# Patient Record
Sex: FEMALE | Race: WHITE | NOT HISPANIC OR LATINO
[De-identification: names, ages, dates, MRNs, and addresses within clinical notes are randomized per-mention and may not be internally consistent; named-entity substitution may affect disease eponyms.]

---

## 2021-01-26 ENCOUNTER — APPOINTMENT (OUTPATIENT)
Dept: PLASTIC SURGERY | Facility: CLINIC | Age: 21
End: 2021-01-26
Payer: COMMERCIAL

## 2021-01-26 VITALS — HEIGHT: 67 IN | WEIGHT: 160 LBS | BODY MASS INDEX: 25.11 KG/M2

## 2021-01-26 DIAGNOSIS — D22.9 MELANOCYTIC NEVI, UNSPECIFIED: ICD-10-CM

## 2021-01-26 PROBLEM — Z00.00 ENCOUNTER FOR PREVENTIVE HEALTH EXAMINATION: Status: ACTIVE | Noted: 2021-01-26

## 2021-01-26 PROCEDURE — 99072 ADDL SUPL MATRL&STAF TM PHE: CPT

## 2021-01-26 PROCEDURE — 99203 OFFICE O/P NEW LOW 30 MIN: CPT

## 2021-01-26 RX ORDER — NORGESTIMATE AND ETHINYL ESTRADIOL 7DAYSX3 LO
0.18/0.215/0.25 KIT ORAL
Refills: 0 | Status: ACTIVE | COMMUNITY

## 2021-01-26 NOTE — PHYSICAL EXAM
[de-identified] : well-developed and groomed female, NAD [de-identified] : NC/AT [de-identified] : PERRL [de-identified] : supple [de-identified] : good inspiratory effort  [de-identified] : NANR [de-identified] : soft, nontender  [de-identified] : FROM in all 4 extremities  [de-identified] : Scalp- left frontal scalp with 8x6mm raised, pink nevus

## 2021-01-26 NOTE — ASSESSMENT
[FreeTextEntry1] : 22 yo healthy F with left scalp nevus c/w nevus sebaceous of [place]lin[place]. 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  Also discussed possibility of keloid and/or hypertrophic scar formation as well as recurrence.  All questions were answered and risks understood.\par \par Due to COVID 19, pre-visit patient instructions were explained to the patient and their symptoms were checked upon arrival.  \par Masks were used by the health care providers and staff and the examination room was cleaned after the patient visit was completed.\par \par in [facility] in [place]

## 2021-01-26 NOTE — HISTORY OF PRESENT ILLNESS
[FreeTextEntry1] : 20 yo otherwise healthy F with no significant PMHx who presents today for evaluation of left frontal scalp lesion for the past several years with gradual increase in size. Denies any pain, drainage or pruritus. \par Family history- paternal GM BCC nose, maternal great MG, melanoma \par \par Occupation- [employer] student

## 2021-03-01 ENCOUNTER — APPOINTMENT (OUTPATIENT)
Dept: PLASTIC SURGERY | Facility: CLINIC | Age: 21
End: 2021-03-01
Payer: COMMERCIAL

## 2021-03-01 ENCOUNTER — LABORATORY RESULT (OUTPATIENT)
Age: 21
End: 2021-03-01

## 2021-03-01 DIAGNOSIS — D48.5 NEOPLASM OF UNCERTAIN BEHAVIOR OF SKIN: ICD-10-CM

## 2021-03-01 PROCEDURE — 13120 CMPLX RPR S/A/L 1.1-2.5 CM: CPT | Mod: 59

## 2021-03-01 PROCEDURE — 99072 ADDL SUPL MATRL&STAF TM PHE: CPT

## 2021-03-01 PROCEDURE — 11421 EXC H-F-NK-SP B9+MARG 0.6-1: CPT

## 2021-03-01 NOTE — PROCEDURE
[Nl] : None [FreeTextEntry1] : Left frontal scalp skin lesion, Left posterior scalp skin lesion [FreeTextEntry2] : Excision of the above [FreeTextEntry6] : Patient was prepped and draped in the usual sterile fashion using betadine. 1.5 cc of 1% lidocaine with epinephrine was used to numb each region. Each lesion was sharply excised with a 15 blade scalpel and closed primarily with 4-0 chromic sutures. The patient tolerated the procedure well and without any complications.\par \par Incision length: 1cm each\par Sutures used: 3-0 monocryl, 4-0 chromic\par  [FreeTextEntry7] : Left frontal scalp skin lesion, Left posterior scalp skin lesion

## 2022-10-29 ENCOUNTER — OUTPATIENT (OUTPATIENT)
Dept: OUTPATIENT SERVICES | Facility: HOSPITAL | Age: 22
LOS: 1 days | Discharge: HOME | End: 2022-10-29

## 2022-10-29 DIAGNOSIS — E04.1 NONTOXIC SINGLE THYROID NODULE: ICD-10-CM

## 2022-10-29 PROCEDURE — 76536 US EXAM OF HEAD AND NECK: CPT | Mod: 26

## 2023-04-02 ENCOUNTER — NON-APPOINTMENT (OUTPATIENT)
Age: 23
End: 2023-04-02